# Patient Record
Sex: FEMALE | Race: OTHER
[De-identification: names, ages, dates, MRNs, and addresses within clinical notes are randomized per-mention and may not be internally consistent; named-entity substitution may affect disease eponyms.]

---

## 2022-10-20 ENCOUNTER — HOSPITAL ENCOUNTER (EMERGENCY)
Dept: HOSPITAL 15 - ER | Age: 52
Discharge: HOME | End: 2022-10-20
Payer: COMMERCIAL

## 2022-10-20 VITALS — WEIGHT: 167.55 LBS | HEIGHT: 65 IN | BODY MASS INDEX: 27.92 KG/M2

## 2022-10-20 VITALS — SYSTOLIC BLOOD PRESSURE: 157 MMHG | DIASTOLIC BLOOD PRESSURE: 108 MMHG

## 2022-10-20 DIAGNOSIS — M79.18: ICD-10-CM

## 2022-10-20 DIAGNOSIS — R07.81: Primary | ICD-10-CM

## 2022-10-20 DIAGNOSIS — M25.512: ICD-10-CM

## 2022-10-20 PROCEDURE — 71046 X-RAY EXAM CHEST 2 VIEWS: CPT

## 2022-11-08 ENCOUNTER — HOSPITAL ENCOUNTER (EMERGENCY)
Dept: HOSPITAL 4 - SED | Age: 52
Discharge: HOME | End: 2022-11-08
Payer: MEDICAID

## 2022-11-08 VITALS — WEIGHT: 167 LBS | HEIGHT: 65 IN | BODY MASS INDEX: 27.82 KG/M2

## 2022-11-08 VITALS — SYSTOLIC BLOOD PRESSURE: 130 MMHG

## 2022-11-08 VITALS — SYSTOLIC BLOOD PRESSURE: 165 MMHG
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
## 2022-11-08 DIAGNOSIS — F15.10: ICD-10-CM

## 2022-11-08 DIAGNOSIS — F17.200: ICD-10-CM

## 2022-11-08 DIAGNOSIS — Z79.899: ICD-10-CM

## 2022-11-08 DIAGNOSIS — M75.112: ICD-10-CM
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
## 2022-11-08 DIAGNOSIS — S40.012A: Primary | ICD-10-CM

## 2022-11-08 PROCEDURE — 99284 EMERGENCY DEPT VISIT MOD MDM: CPT

## 2022-11-08 PROCEDURE — 73030 X-RAY EXAM OF SHOULDER: CPT

## 2022-11-08 PROCEDURE — 71100 X-RAY EXAM RIBS UNI 2 VIEWS: CPT

## 2022-11-08 NOTE — NUR
EDUCATED PATIENT ON MEDICATION, TORADOL. PATIENT AGREED TO MEDICATION AND THEN 
STARTED TO SCREAM WHEN ABOUT TO ADMINISTER STATING " I DON'T WANT IT!" MD 
NOTIFIED AT BEDSIDE.

## 2022-11-08 NOTE — NUR
DC PT HOME AAOX4, NO SOB NOTED AND NAD. DC INSTRUCTION WERE GIVEN TO PT ALSO 
INSTRUCTED TO F/U WITH HER PCP, SHE VERBALIZED UNDERSTANDING. RESOURCES GIVEN 
TO PT  AND INFORMED HER THAT SHE CAN GO TO [facility] OR [facility] FOR FOLLOW 
UP.

## 2022-11-08 NOTE — NUR
BIB friend from [place] s/p fell off the scooter two weeks ago.Pts sts shes 
been seen in [facility] [place] 2x and [facility] w/ same prob.Pt denies 
SOB,CP,N/V/Abd pain.Pt states shes been taking ibuprofen w/ no improvement.RR 
even/unlbaored.VSS.Will con to monitor.